# Patient Record
Sex: FEMALE | Employment: OTHER | URBAN - METROPOLITAN AREA
[De-identification: names, ages, dates, MRNs, and addresses within clinical notes are randomized per-mention and may not be internally consistent; named-entity substitution may affect disease eponyms.]

---

## 2018-01-31 ENCOUNTER — RADIANT APPOINTMENT (OUTPATIENT)
Dept: MAMMOGRAPHY | Facility: CLINIC | Age: 63
End: 2018-01-31
Payer: COMMERCIAL

## 2018-01-31 ENCOUNTER — OFFICE VISIT (OUTPATIENT)
Dept: OBGYN | Facility: CLINIC | Age: 63
End: 2018-01-31
Payer: COMMERCIAL

## 2018-01-31 VITALS
WEIGHT: 109 LBS | HEIGHT: 62 IN | SYSTOLIC BLOOD PRESSURE: 82 MMHG | DIASTOLIC BLOOD PRESSURE: 60 MMHG | BODY MASS INDEX: 20.06 KG/M2

## 2018-01-31 DIAGNOSIS — F51.01 PRIMARY INSOMNIA: ICD-10-CM

## 2018-01-31 DIAGNOSIS — Z13.29 SCREENING FOR THYROID DISORDER: ICD-10-CM

## 2018-01-31 DIAGNOSIS — M16.11 PRIMARY OSTEOARTHRITIS OF RIGHT HIP: ICD-10-CM

## 2018-01-31 DIAGNOSIS — Z13.6 SCREENING FOR CARDIOVASCULAR CONDITION: ICD-10-CM

## 2018-01-31 DIAGNOSIS — Z01.419 ENCOUNTER FOR GYNECOLOGICAL EXAMINATION WITHOUT ABNORMAL FINDING: Primary | ICD-10-CM

## 2018-01-31 DIAGNOSIS — M85.80 OSTEOPENIA, SENILE: ICD-10-CM

## 2018-01-31 DIAGNOSIS — Z13.228 SCREENING FOR METABOLIC DISORDER: ICD-10-CM

## 2018-01-31 DIAGNOSIS — Z11.8 SCREENING FOR CHLAMYDIAL DISEASE: ICD-10-CM

## 2018-01-31 DIAGNOSIS — Z12.11 SCREEN FOR COLON CANCER: ICD-10-CM

## 2018-01-31 DIAGNOSIS — Z12.31 VISIT FOR SCREENING MAMMOGRAM: ICD-10-CM

## 2018-01-31 DIAGNOSIS — Z11.3 SCREEN FOR STD (SEXUALLY TRANSMITTED DISEASE): ICD-10-CM

## 2018-01-31 PROCEDURE — 99386 PREV VISIT NEW AGE 40-64: CPT | Performed by: OBSTETRICS & GYNECOLOGY

## 2018-01-31 PROCEDURE — 77063 BREAST TOMOSYNTHESIS BI: CPT | Mod: TC

## 2018-01-31 PROCEDURE — G0145 SCR C/V CYTO,THINLAYER,RESCR: HCPCS | Performed by: OBSTETRICS & GYNECOLOGY

## 2018-01-31 PROCEDURE — 77067 SCR MAMMO BI INCL CAD: CPT | Mod: TC

## 2018-01-31 RX ORDER — TRAZODONE HYDROCHLORIDE 100 MG/1
100 TABLET ORAL
COMMUNITY
Start: 2017-10-10 | End: 2020-08-11

## 2018-01-31 RX ORDER — CALCIUM CARBONATE 500(1250)
1 TABLET ORAL 2 TIMES DAILY
COMMUNITY

## 2018-01-31 ASSESSMENT — ANXIETY QUESTIONNAIRES
7. FEELING AFRAID AS IF SOMETHING AWFUL MIGHT HAPPEN: NOT AT ALL
IF YOU CHECKED OFF ANY PROBLEMS ON THIS QUESTIONNAIRE, HOW DIFFICULT HAVE THESE PROBLEMS MADE IT FOR YOU TO DO YOUR WORK, TAKE CARE OF THINGS AT HOME, OR GET ALONG WITH OTHER PEOPLE: NOT DIFFICULT AT ALL
2. NOT BEING ABLE TO STOP OR CONTROL WORRYING: NOT AT ALL
GAD7 TOTAL SCORE: 0
5. BEING SO RESTLESS THAT IT IS HARD TO SIT STILL: NOT AT ALL
3. WORRYING TOO MUCH ABOUT DIFFERENT THINGS: NOT AT ALL
1. FEELING NERVOUS, ANXIOUS, OR ON EDGE: NOT AT ALL
6. BECOMING EASILY ANNOYED OR IRRITABLE: NOT AT ALL

## 2018-01-31 ASSESSMENT — PATIENT HEALTH QUESTIONNAIRE - PHQ9: 5. POOR APPETITE OR OVEREATING: NOT AT ALL

## 2018-01-31 NOTE — MR AVS SNAPSHOT
After Visit Summary   1/31/2018    Liliana Murray    MRN: [id number]           Patient Information     Date Of Birth          1955        Visit Information        Provider Department      1/31/2018 10:40 AM Brandy Logan MD [facility]        Today's Diagnoses     Encounter for gynecological examination without abnormal finding    -  1    Screening for cardiovascular condition        Screening for metabolic disorder        Screening for thyroid disorder        Screen for colon cancer        Osteopenia, senile        Screen for STD (sexually transmitted disease)        Screening for chlamydial disease           Follow-ups after your visit        Your next 10 appointments already scheduled     Mar 07, 2018  9:00 AM CST   DX HIP/PELVIS/SPINE with WEDEXA1   [facility] ([facility])    [address] [place] [id number] [phone]           Please do not take any of the following 24 hours prior to the day of your exam: vitamins, calcium tablets, antacids.  If possible, please wear clothes without metal (snaps, zippers). A sweatsuit works well.            Mar 07, 2018  9:40 AM CST   LAB with WE LAB   [facility] ([facility])    [address] [place] [id number] [phone]           Please do not eat 10-12 hours before your appointment if you are coming in fasting for labs on lipids, cholesterol, or glucose (sugar). This does not apply to pregnant women. Water, hot tea and black coffee (with nothing added) are okay. Do not drink other fluids, diet soda or chew gum.              Who to contact     If you have questions or need follow up information about today's clinic visit or your schedule please contact [facility] [place] directly at [phone].  Normal or non-critical lab and imaging results will be communicated to you by  "MyChart, letter or phone within 4 business days after the clinic has received the results. If you do not hear from us within 7 days, please contact the clinic through [employer] or phone. If you have a critical or abnormal lab result, we will notify you by phone as soon as possible.  Submit refill requests through Plivo or call your pharmacy and they will forward the refill request to us. Please allow 3 business days for your refill to be completed.          Additional Information About Your Visit        [employer] Information     Plivo lets you send messages to your doctor, view your test results, renew your prescriptions, schedule appointments and more. To sign up, go to [email or web address] . Click on \"Log in\" on the left side of the screen, which will take you to the Welcome page. Then click on \"Sign up Now\" on the right side of the page.     You will be asked to enter the access code listed below, as well as some personal information. Please follow the directions to create your username and password.     Your access code is: [email or web address]  Expires: 2018 12:01 PM     Your access code will  in 90 days. If you need help or a new code, please call your [facility] clinic or [phone].        Care EveryWhere ID     This is your Care EveryWhere ID. This could be used by other organizations to access your [facility] medical records  [id number]        Your Vitals Were     Height BMI (Body Mass Index)                5' 1.5\" (1.562 m) 20.26 kg/m2           Blood Pressure from Last 3 Encounters:   18 (!) 82/60    Weight from Last 3 Encounters:   18 109 lb (49.4 kg)              Today, you had the following     No orders found for display       Primary Care Provider Office Phone # Fax #    Kelle Stanley [phone]       AMY CLARK [address]  [place] [address]        Equal Access to Services     JOSHUA BRAMBILA AH: Hadii lulu Camp, zhen loo, " karin caroalwilliam onofre farazoksana gareth awadaaoksana ah. So [facility] [phone].    ATENCIÓN: Si fannie morales, tiene a farris disposición servicios gratuitos de asistencia lingüística. Baron al [phone].    We comply with applicable federal civil rights laws and [place] laws. We do not discriminate on the basis of race, color, national origin, age, disability, sex, sexual orientation, or gender identity.            Thank you!     Thank you for choosing [facility]  for your care. Our goal is always to provide you with excellent care. Hearing back from our patients is one way we can continue to improve our services. Please take a few minutes to complete the written survey that you may receive in the mail after your visit with us. Thank you!             Your Updated Medication List - Protect others around you: Learn how to safely use, store and throw away your medicines at www.disposemymeds.org.          This list is accurate as of 1/31/18 12:01 PM.  Always use your most recent med list.                   Brand Name Dispense Instructions for use Diagnosis    calcium carbonate 1250 MG tablet    OS- mg [place]. [place]     Take 1 tablet by mouth 2 times daily        traZODone 100 MG tablet    DESYREL     Take 100 mg by mouth        VITAMIN B 12 PO           VITAMIN C PO           VITAMIN D (CHOLECALCIFEROL) PO      Take by mouth daily

## 2018-01-31 NOTE — PROGRESS NOTES
Liliana is a 62 year old  female who presents for annual exam.     Besides routine health maintenance, she has no other health concerns today .    HPI:  The patient's PCP is Kelle Stanley MD.  Patient hasn't been here since summer of 2014. Had a PCP but she is now retired and so wondering about a referral closer to home in [place] in case she gets sick, etc.    Has been doing 100mg trazodone with an otc sleep aid together and that works great for insomnia. Tried both of them individually and they didn't help. Started on that after her hip replacement but had had insomnia for years prior to that    Patient had some perimenopausal bleeding in  and did the hysteroscopy for it. No bleeding since then. Some very mild menopausal sx but hardly at all. Patient is newly s.a as she is dating someone. Not having vaginal dryness or pain with I.C. Not needing a lubricant. He was  for 30 yrs prior to them dating. Declines any std testing at first b/c she's sure it fine but after discussing it patient is agreeable to do it when rtns for other labs b/c overdue for fasting labs and has fhx of DM and high cholesterol in her dad and brother.    Patient had severe osteopenia on last dexa and that was over 5 yrs ago already. Never did meds and never followed up. Has since lost an inch of height. Was having some hip pain when here last. Ended up getting right hip replaced and doing great now. Can't run anymore b/c of it but still exercising regularly 2-3x/week with weight and cardio      GYNECOLOGIC HISTORY:    No LMP recorded. Patient is postmenopausal.  Her current contraception method is: menopause.  She  has no tobacco history on file.    Patient is sexually active.  STD testing offered?  Declined  Last PHQ-9 score on record =   PHQ-9 SCORE 2018   Total Score 1     Last GAD7 score on record =   EVELIN-7 SCORE 2018   Total Score 0     Alcohol Score = 2    HEALTH MAINTENANCE:  Cholesterol: 11/16/10     Total= 179,  Triglycerides=51, HDL=66, BBX=412  Cholesterol   Date Value Ref Range Status   2010 179 0 - 200 mg/dL Final     Comment:     LDL Cholesterol is the primary guide to therapy.     The NCEP recommends further evaluation of: patients with cholesterol <200   mg/dL   if additional risk factors are present, cholesterol >240 mg/dL, triglycerides   >150 mg/dL, or HDL <40 mg/dL.   Last Mammo: 16, Result: normal, Next Mammo: today  Pap: 14 NIL HPV Neg  Colonoscopy: Never, Result: not applicable, Next Colonoscopy: Will place referral for patient   Dexa: 2012    Health maintenance updated:  yes    HISTORY:  Obstetric History       T2      L2     SAB0   TAB0   Ectopic0   Multiple0   Live Births2       # Outcome Date GA Lbr Kofi/2nd Weight Sex Delivery Anes PTL Lv   2 Term     M CS-LTranv   DARIEL   1 Term     F CS-LTranv   DARIEL          Patient Active Problem List   Diagnosis     Status post right hip replacement     Osteopenia, senile     Primary insomnia     Past Surgical History:   Procedure Laterality Date     AS PARTIAL HIP REPLACEMENT  2017     HYSTEROSCOPY  2010      Social History   Substance Use Topics     Smoking status: Not on file     Smokeless tobacco: Not on file     Alcohol use Not on file      Problem (# of Occurrences) Relation (Name,Age of Onset)    CANCER (1) Father    DIABETES (2) Father, Brother    HEART DISEASE (1) Maternal Grandmother    Hyperlipidemia (2) Father, Brother    Hypertension (2) Father, Brother            Current Outpatient Prescriptions   Medication Sig     Ascorbic Acid (VITAMIN C PO)      Cyanocobalamin (VITAMIN B 12 PO)      calcium carbonate (OS- [place]. [place]) 1250 MG tablet Take 1 tablet by mouth 2 times daily     VITAMIN D, CHOLECALCIFEROL, PO Take by mouth daily     traZODone (DESYREL) 100 MG tablet Take 100 mg by mouth     No current facility-administered medications for this visit.      Allergies   Allergen Reactions     Diatrizoate  "Swelling, Hives and Shortness Of Breath     Hydrocodone-Acetaminophen Itching       Past medical, surgical, social and family histories were reviewed and updated in EPIC.    ROS:   12 point review of systems negative other than symptoms noted below.  Head: Nasal Congestion  Psychiatric: Difficulty Sleeping    EXAM:  BP (!) 82/60  Ht 5' 1.5\" (1.562 m)  Wt 109 lb (49.4 kg)  BMI 20.26 kg/m2   BMI: Body mass index is 20.26 kg/(m^2).    PHYSICAL EXAM:  Constitutional:  Appearance: Well nourished, well developed, alert, in no acute distress  Neck:  Lymph Nodes:  No lymphadenopathy present    Thyroid:  Gland size normal, nontender, no nodules or masses present  on palpation  Chest:  Respiratory Effort:  Breathing unlabored  Cardiovascular:    Heart: Auscultation:  Regular rate, normal rhythm, no murmurs present  Breasts: Palpation of Breasts and Axillae:  No masses present on palpation, no breast tenderness. and No nodularity, asymmetry or nipple discharge bilaterally.  Gastrointestinal:   Abdominal Examination:  Abdomen nontender to palpation, tone normal without rigidity or guarding, no masses present, umbilicus without lesions   Liver and Spleen:  No hepatomegaly present, liver nontender to palpation    Hernias:  No hernias present  Lymphatic: Lymph Nodes:  No other lymphadenopathy present  Skin:  General Inspection:  No rashes present, no lesions present, no areas of  discoloration    Genitalia and Groin:  No rashes present, no lesions present, no areas of  discoloration, no masses present  Neurologic/Psychiatric:    Mental Status:  Oriented X3     Pelvic Exam:  External Genitalia:     Normal appearance for age, no discharge present, no tenderness present, no inflammatory lesions present, color normal  Vagina:     Normal vaginal vault without central or paravaginal defects, ATROPHIC  Bladder:     Nontender to palpation  Urethra:   Urethral Body:  Urethra palpation normal, urethra structural support normal   Urethral " Meatus:  No erythema or lesions present  Cervix:     Appearance healthy, no lesions present, nontender to palpation, no bleeding present  Uterus:     Nontender to palpation, no masses present, position anteflexed, mobility: normal  Adnexa:     No adnexal tenderness present, no adnexal masses present  Perineum:     Perineum within normal limits, no evidence of trauma, no rashes or skin lesions present  Inguinal Lymph Nodes:     No lymphadenopathy present      COUNSELING:   Reviewed preventive health counseling, as reflected in patient instructions  Special attention given to:        Immunizations    pneumovax           Osteoporosis Prevention/Bone Health    BMI: Body mass index is 20.26 kg/(m^2).      ASSESSMENT:  62 year old female with satisfactory annual exam.    ICD-10-CM    1. Encounter for gynecological examination without abnormal finding Z01.419 Pap imaged thin layer screen with HPV - recommended age 30 - 65     HPV High Risk Types DNA Cervical   2. Osteopenia, senile M85.80 DX Hip/Pelvis/Spine   3. Primary insomnia F51.01    4. Primary osteoarthritis of right hip M16.11    5. Screening for cardiovascular condition Z13.6 Lipid panel reflex to direct LDL Fasting   6. Screening for metabolic disorder Z13.228 Comprehensive metabolic panel   7. Screening for thyroid disorder Z13.29 TSH with Free T4 Reflex   8. Screen for colon cancer Z12.11 GASTROENTEROLOGY ADULT REF PROCEDURE ONLY   9. Screen for STD (sexually transmitted disease) Z11.3 Anti Treponema     Herpes Simplex Virus 1 and 2 IgG     HIV Antigen Antibody Combo     Hepatitis C Antibody     NEISSERIA GONORRHOEA PCR   10. Screening for chlamydial disease Z11.8 CHLAMYDIA TRACHOMATIS PCR       PLAN:  Pap was done today though technically is UTD. Patient had normal and neg for HPV pap 3 yrs ago but the year prior was HPV pos and wasn't even s.a at that time. Now patient has a new partner since she was in and it's been almost 4 yrs since pap so chose to repeat  it  mammo today  Will return for fasting labs, dexa and full std screening  Referral sent for colonoscopy and patient is willing to do it now that retired and has more time  Discussed her low weight being a big risk for osteoporosis and patient has lost an inch of height since last here as well so need to repeat dexa and figure out next steps. Discussed weight bearing exercise, calcium, vitamin D    Brandy Logan MD

## 2018-02-01 PROBLEM — F51.01 PRIMARY INSOMNIA: Status: ACTIVE | Noted: 2018-02-01

## 2018-02-01 PROBLEM — Z96.641 STATUS POST RIGHT HIP REPLACEMENT: Status: ACTIVE | Noted: 2018-02-01

## 2018-02-01 PROBLEM — M85.80 OSTEOPENIA, SENILE: Status: ACTIVE | Noted: 2018-02-01

## 2018-02-01 PROCEDURE — 87624 HPV HI-RISK TYP POOLED RSLT: CPT | Performed by: OBSTETRICS & GYNECOLOGY

## 2018-02-01 ASSESSMENT — PATIENT HEALTH QUESTIONNAIRE - PHQ9: SUM OF ALL RESPONSES TO PHQ QUESTIONS 1-9: 1

## 2018-02-01 ASSESSMENT — ANXIETY QUESTIONNAIRES: GAD7 TOTAL SCORE: 0

## 2018-02-03 ENCOUNTER — HEALTH MAINTENANCE LETTER (OUTPATIENT)
Age: 63
End: 2018-02-03

## 2018-02-05 LAB
COPATH REPORT: NORMAL
PAP: NORMAL

## 2018-02-07 LAB
FINAL DIAGNOSIS: NORMAL
HPV HR 12 DNA CVX QL NAA+PROBE: NEGATIVE
HPV16 DNA SPEC QL NAA+PROBE: NEGATIVE
HPV18 DNA SPEC QL NAA+PROBE: NEGATIVE
SPECIMEN DESCRIPTION: NORMAL
SPECIMEN SOURCE CVX/VAG CYTO: NORMAL

## 2018-03-07 ENCOUNTER — RADIANT APPOINTMENT (OUTPATIENT)
Dept: BONE DENSITY | Facility: CLINIC | Age: 63
End: 2018-03-07
Payer: COMMERCIAL

## 2018-03-07 DIAGNOSIS — Z13.6 SCREENING FOR CARDIOVASCULAR CONDITION: ICD-10-CM

## 2018-03-07 DIAGNOSIS — M81.0 OSTEOPOROSIS, SENILE: Primary | ICD-10-CM

## 2018-03-07 DIAGNOSIS — Z13.29 SCREENING FOR THYROID DISORDER: ICD-10-CM

## 2018-03-07 DIAGNOSIS — Z13.21 ENCOUNTER FOR VITAMIN DEFICIENCY SCREENING: Primary | ICD-10-CM

## 2018-03-07 DIAGNOSIS — Z13.228 SCREENING FOR METABOLIC DISORDER: ICD-10-CM

## 2018-03-07 DIAGNOSIS — M85.80 OSTEOPENIA, SENILE: ICD-10-CM

## 2018-03-07 DIAGNOSIS — Z11.3 SCREEN FOR STD (SEXUALLY TRANSMITTED DISEASE): ICD-10-CM

## 2018-03-07 DIAGNOSIS — Z78.0 ASYMPTOMATIC POSTMENOPAUSAL STATE: ICD-10-CM

## 2018-03-07 DIAGNOSIS — Z11.8 SCREENING FOR CHLAMYDIAL DISEASE: ICD-10-CM

## 2018-03-07 LAB
ALBUMIN SERPL-MCNC: 3.9 G/DL (ref 3.4–5)
ALP SERPL-CCNC: 83 U/L (ref 40–150)
ALT SERPL W P-5'-P-CCNC: 17 U/L (ref 0–50)
ANION GAP SERPL CALCULATED.3IONS-SCNC: 3 MMOL/L (ref 3–14)
AST SERPL W P-5'-P-CCNC: 20 U/L (ref 0–45)
BILIRUB SERPL-MCNC: 0.4 MG/DL (ref 0.2–1.3)
BUN SERPL-MCNC: 13 MG/DL (ref 7–30)
CALCIUM SERPL-MCNC: 8.8 MG/DL (ref 8.5–10.1)
CHLORIDE SERPL-SCNC: 102 MMOL/L (ref 94–109)
CHOLEST SERPL-MCNC: 162 MG/DL
CO2 SERPL-SCNC: 30 MMOL/L (ref 20–32)
CREAT SERPL-MCNC: 0.65 MG/DL (ref 0.52–1.04)
GFR SERPL CREATININE-BSD FRML MDRD: >90 ML/MIN/1.7M2
GLUCOSE SERPL-MCNC: 81 MG/DL (ref 70–99)
HDLC SERPL-MCNC: 81 MG/DL
LDLC SERPL CALC-MCNC: 73 MG/DL
MAGNESIUM SERPL-MCNC: 2.2 MG/DL (ref 1.6–2.3)
NONHDLC SERPL-MCNC: 81 MG/DL
PHOSPHATE SERPL-MCNC: 4 MG/DL (ref 2.5–4.5)
POTASSIUM SERPL-SCNC: 3.8 MMOL/L (ref 3.4–5.3)
PROT SERPL-MCNC: 8 G/DL (ref 6.8–8.8)
SODIUM SERPL-SCNC: 135 MMOL/L (ref 133–144)
TRIGL SERPL-MCNC: 40 MG/DL
TSH SERPL DL<=0.005 MIU/L-ACNC: 2.66 MU/L (ref 0.4–4)

## 2018-03-07 PROCEDURE — 77080 DXA BONE DENSITY AXIAL: CPT | Mod: 59

## 2018-03-07 PROCEDURE — 86696 HERPES SIMPLEX TYPE 2 TEST: CPT | Performed by: OBSTETRICS & GYNECOLOGY

## 2018-03-07 PROCEDURE — 80061 LIPID PANEL: CPT | Performed by: OBSTETRICS & GYNECOLOGY

## 2018-03-07 PROCEDURE — 86695 HERPES SIMPLEX TYPE 1 TEST: CPT | Performed by: OBSTETRICS & GYNECOLOGY

## 2018-03-07 PROCEDURE — 82306 VITAMIN D 25 HYDROXY: CPT | Performed by: OBSTETRICS & GYNECOLOGY

## 2018-03-07 PROCEDURE — 87491 CHLMYD TRACH DNA AMP PROBE: CPT | Performed by: OBSTETRICS & GYNECOLOGY

## 2018-03-07 PROCEDURE — 87591 N.GONORRHOEAE DNA AMP PROB: CPT | Performed by: OBSTETRICS & GYNECOLOGY

## 2018-03-07 PROCEDURE — 83735 ASSAY OF MAGNESIUM: CPT | Performed by: OBSTETRICS & GYNECOLOGY

## 2018-03-07 PROCEDURE — 86780 TREPONEMA PALLIDUM: CPT | Performed by: OBSTETRICS & GYNECOLOGY

## 2018-03-07 PROCEDURE — 87389 HIV-1 AG W/HIV-1&-2 AB AG IA: CPT | Performed by: OBSTETRICS & GYNECOLOGY

## 2018-03-07 PROCEDURE — 80053 COMPREHEN METABOLIC PANEL: CPT | Performed by: OBSTETRICS & GYNECOLOGY

## 2018-03-07 PROCEDURE — 77081 DXA BONE DENSITY APPENDICULR: CPT

## 2018-03-07 PROCEDURE — 84443 ASSAY THYROID STIM HORMONE: CPT | Performed by: OBSTETRICS & GYNECOLOGY

## 2018-03-07 PROCEDURE — 36415 COLL VENOUS BLD VENIPUNCTURE: CPT | Performed by: OBSTETRICS & GYNECOLOGY

## 2018-03-07 PROCEDURE — 84100 ASSAY OF PHOSPHORUS: CPT | Performed by: OBSTETRICS & GYNECOLOGY

## 2018-03-07 PROCEDURE — 86803 HEPATITIS C AB TEST: CPT | Performed by: OBSTETRICS & GYNECOLOGY

## 2018-03-08 LAB
C TRACH DNA SPEC QL NAA+PROBE: NEGATIVE
DEPRECATED CALCIDIOL+CALCIFEROL SERPL-MC: 71 UG/L (ref 20–75)
HCV AB SERPL QL IA: NONREACTIVE
HIV 1+2 AB+HIV1 P24 AG SERPL QL IA: NONREACTIVE
HSV1 IGG SERPL QL IA: >8 AI (ref 0–0.8)
HSV2 IGG SERPL QL IA: <0.2 AI (ref 0–0.8)
N GONORRHOEA DNA SPEC QL NAA+PROBE: NEGATIVE
SPECIMEN SOURCE: NORMAL
SPECIMEN SOURCE: NORMAL
T PALLIDUM IGG+IGM SER QL: NEGATIVE

## 2018-03-21 ENCOUNTER — OFFICE VISIT (OUTPATIENT)
Dept: OBGYN | Facility: CLINIC | Age: 63
End: 2018-03-21
Payer: COMMERCIAL

## 2018-03-21 VITALS
HEIGHT: 62 IN | DIASTOLIC BLOOD PRESSURE: 64 MMHG | BODY MASS INDEX: 20.43 KG/M2 | WEIGHT: 111 LBS | SYSTOLIC BLOOD PRESSURE: 112 MMHG

## 2018-03-21 DIAGNOSIS — M81.0 OSTEOPOROSIS, SENILE: Primary | ICD-10-CM

## 2018-03-21 PROBLEM — M85.80 OSTEOPENIA, SENILE: Status: RESOLVED | Noted: 2018-02-01 | Resolved: 2018-03-21

## 2018-03-21 PROCEDURE — 99214 OFFICE O/P EST MOD 30 MIN: CPT | Performed by: OBSTETRICS & GYNECOLOGY

## 2018-03-21 NOTE — Clinical Note
Ordered prolia for this person. I assume we wait for insurance to contact us on coverage? Just wanted it on your radar. thx

## 2018-03-21 NOTE — MR AVS SNAPSHOT
"              After Visit Summary   3/21/2018    Liliana Murray    MRN: [id number]           Patient Information     Date Of Birth          1955        Visit Information        Provider Department      3/21/2018 3:40 PM Brandy Logan MD [facility] [place]        Today's Diagnoses     Osteoporosis, senile    -  1       Follow-ups after your visit        Who to contact     If you have questions or need follow up information about today's clinic visit or your schedule please contact [facility] [place] directly at [phone].  Normal or non-critical lab and imaging results will be communicated to you by [employer], letter or phone within 4 business days after the clinic has received the results. If you do not hear from us within 7 days, please contact the clinic through [employer] or phone. If you have a critical or abnormal lab result, we will notify you by phone as soon as possible.  Submit refill requests through [employer]we[employer] or call your pharmacy and they will forward the refill request to us. Please allow 3 business days for your refill to be completed.          Additional Information About Your Visit        MyChart Information     [employer] gives you secure access to your electronic health record. If you see a primary care provider, you can also send messages to your care team and make appointments. If you have questions, please call your primary care clinic.  If you do not have a primary care provider, please call [phone] and they will assist you.        Care EveryWhere ID     This is your Care EveryWhere ID. This could be used by other organizations to access your [place] medical records  [id number]        Your Vitals Were     Height BMI (Body Mass Index)                5' 1.5\" (1.562 m) 20.63 kg/m2           Blood Pressure from Last 3 Encounters:   03/21/18 112/64   01/31/18 (!) 82/60    Weight from Last 3 Encounters:   03/21/18 111 lb (50.3 kg)   01/31/18 109 lb (49.4 kg)    "           Today, you had the following     No orders found for display         Today's Medication Changes          These changes are accurate as of 3/21/18 11:59 PM.  If you have any questions, ask your nurse or doctor.               Start taking these medicines.        Dose/Directions    denosumab 60 MG/ML Soln injection   Commonly known as:  PROLIA   Used for:  Osteoporosis, senile   Started by:  Brandy Logan MD        Dose:  60 mg   Inject 1 mL (60 mg) Subcutaneous once for 1 dose   Quantity:  1 mL   Refills:  1            Where to get your medicines      These medications were sent to [id number] - [place] [address] AT [facility] [place] [address], [facility] [address]     Phone:  [phone]     denosumab 60 MG/ML Soln injection                Primary Care Provider Office Phone # Fax #    Kelle Stanley [phone]       AMY CLARK [address]  [place] [address]        Equal Access to Services     [facility]: Hadii aad ku hadasho Soomaali, waaxda luqadaha, qaybta kaalmada adeegyada, waxay idiin hayaan gareth hernandez . So [facility] [phone].    ATENCIÓN: Si habla español, tiene a farris disposición servicios gratuitos de asistencia lingüística. [facility] [phone].    We comply with applicable federal civil rights laws and [place] laws. We do not discriminate on the basis of race, color, national origin, age, disability, sex, sexual orientation, or gender identity.            Thank you!     Thank you for choosing [facility] [place]  for your care. Our goal is always to provide you with excellent care. Hearing back from our patients is one way we can continue to improve our services. Please take a few minutes to complete the written survey that you may receive in the mail after your visit with us. Thank you!             Your Updated Medication List - Protect others around you: Learn how to safely  use, store and throw away your medicines at www.disposemymeds.org.          This list is accurate as of 3/21/18 11:59 PM.  Always use your most recent med list.                   Brand Name Dispense Instructions for use Diagnosis    calcium carbonate 1250 MG tablet    OS- mg [place]. Ca     Take 1 tablet by mouth 2 times daily        denosumab 60 MG/ML Soln injection    PROLIA    1 mL    Inject 1 mL (60 mg) Subcutaneous once for 1 dose    Osteoporosis, senile       traZODone 100 MG tablet    DESYREL     Take 100 mg by mouth        TURMERIC PO           VITAMIN B 12 PO           VITAMIN C PO           VITAMIN D (CHOLECALCIFEROL) PO      Take by mouth daily

## 2018-03-21 NOTE — PROGRESS NOTES
SUBJECTIVE:                                                   Liliana Murray is a 63 year old female who presents to clinic today for the following health issue(s):  Patient presents with:  Results: discuss DEXA        HPI:  Patient had a dexa on 3/8/18 that showed severe osteoporosis in her wrist. This was done b/c of unilateral hip replacement. The T score was -7.4  Her non replaced hip was -2.5 and her total spine was -2.4. However her L2-L3 was -2.9 with one vertebral body at -3.2 and another at -2.6  Patient's last dexa showed penia so this is significant progression  Patient is doing a lot of weight bearing exercise. Walking, jogging, jumping rope, eliptical and arm weights.  She is taking her calcium and vitamin D religiously since finding out but wasn't as regimented before that. She did have levels drawn and her ca, D, mg, phos were all normal  Patient's sister on fosamax for osteoporosis and it really worries her to get a dowager's hump  Here to discuss options    No LMP recorded. Patient is postmenopausal..   Patient is sexually active, .   reports that she has never smoked. She has never used smokeless tobacco.  Health maintenance updated:  yes        Problem list and histories reviewed & adjusted, as indicated.  Additional history: as documented.    Patient Active Problem List   Diagnosis     Status post right hip replacement     Primary insomnia     Osteoporosis, senile     Past Surgical History:   Procedure Laterality Date     AS PARTIAL HIP REPLACEMENT  2017     HYSTEROSCOPY  2010      Social History   Substance Use Topics     Smoking status: Never Smoker     Smokeless tobacco: Never Used     Alcohol use Not on file      Problem (# of Occurrences) Relation (Name,Age of Onset)    CANCER (1) Father    DIABETES (2) Father, Brother    HEART DISEASE (1) Maternal Grandmother    Hyperlipidemia (2) Father, Brother    Hypertension (2) Father, Brother            Current Outpatient  "Prescriptions   Medication Sig     TURMERIC PO      denosumab (PROLIA) 60 MG/ML SOLN injection Inject 1 mL (60 mg) Subcutaneous once for 1 dose     Ascorbic Acid (VITAMIN C PO)      Cyanocobalamin (VITAMIN B 12 PO)      calcium carbonate (OS- [place]. CA) 1250 MG tablet Take 1 tablet by mouth 2 times daily     VITAMIN D, CHOLECALCIFEROL, PO Take by mouth daily     traZODone (DESYREL) 100 MG tablet Take 100 mg by mouth     No current facility-administered medications for this visit.      Allergies   Allergen Reactions     Diatrizoate Swelling, Hives and Shortness Of Breath     Hydrocodone-Acetaminophen Itching       ROS:  12 point review of systems negative other than symptoms noted below.    OBJECTIVE:     /64  Ht 5' 1.5\" (1.562 m)  Wt 111 lb (50.3 kg)  BMI 20.63 kg/m2  Body mass index is 20.63 kg/(m^2).    Exam:  Constitutional:  Appearance: Well nourished, well developed alert, in no acute distress     In-Clinic Test Results:  No results found for this or any previous visit (from the past 24 hour(s)).    ASSESSMENT/PLAN:                                                        ICD-10-CM    1. Osteoporosis, senile M81.0 denosumab (PROLIA) 60 MG/ML SOLN injection       Discussed her dexa scores and reviewed images together and discussed vitamins, weight bearing, bisphosphanate therapy with all of it's r/b/side effects, discussed estrogen and SERMs.  Patient is actually taking more D than needed. Taking 5000 units daily in addition to some D in her calcium. Ok to switch to just 8111-6640 max u/day  Discussed weights and weight bearing exercise  Patient has no vasomotor sx that ERT would be indicated for so at this point I feel that our best course of action is bisphosphanate therapy  Patient already has some GERD and may not tolerate fosamax. In addition to this I have informed her that the rate of efficacy and improvement is much greater with prolia than with fosamax and with her ulna especially being " so osteoporotic I would prefer we go with prolia.  Will find out insurance coverage and proceed if covered/affordable.  Spent 25 minutes with her 100% of which was in face to face counseling time and answered many questions that the patient had      Brandy Logan MD  [facility]

## 2018-03-22 ENCOUNTER — TELEPHONE (OUTPATIENT)
Dept: OBGYN | Facility: CLINIC | Age: 63
End: 2018-03-22

## 2018-03-22 DIAGNOSIS — M81.0 OSTEOPOROSIS, SENILE: Primary | ICD-10-CM

## 2018-03-22 NOTE — TELEPHONE ENCOUNTER
New RX for Prolia entered and routed to [facility] to start process. Patient aware of we will contact her once Prolia has received request and they have reviewed coverage with her insurance plan and we hear back from them.

## 2018-04-04 NOTE — TELEPHONE ENCOUNTER
Benefits relayed to patient,  explained to patient that her insurance requires a PA, explained that at this point can still be denied.  Patient concerned about cost. I explained the Prolia copay program. Patient feels at this point she has further questions that I was not able to answer for her. She would like to talk with Dr. Logan. I offered her appointment and patient states she was already here for this and if able would like to handle by phone. I had patient schedule appointment to have a spot, and she can cancel if Dr. Logan feels that this could be done by phone.     Buy/bill ok. Needs a PA. She needs to reach her $1000 ded (met), responsible for 20% up till OPM of $2000 ($1130.41 met so far), the coverage increases to 100%.    PA faxed, await response.

## 2018-04-07 NOTE — TELEPHONE ENCOUNTER
i'm routing this to triage to please figure out what her questions are to see if they can be answered by phone

## 2018-04-11 ENCOUNTER — TELEPHONE (OUTPATIENT)
Dept: NURSING | Facility: CLINIC | Age: 63
End: 2018-04-11

## 2018-04-11 RX ORDER — ALENDRONATE SODIUM 70 MG/1
TABLET ORAL
Qty: 12 TABLET | Refills: 3 | Status: SHIPPED | OUTPATIENT
Start: 2018-04-11 | End: 2019-04-18

## 2018-04-11 NOTE — TELEPHONE ENCOUNTER
I think a lot of these questions can be triaged by RN    We use prolia for one year, then do dexa, if good results but still not good score would continue 2 more years and repeat dexa. At some point yes, we try to get to a good score and then take a break for awhile but that's how we monitor bone loss, with dexa.    No way to monitor in the jaw. What her dentist said is unrelated to the jaw necrosis with bisphophanate and that is incredibly low risk anyway and I wouldn't worry about it.    No, I don't seem the same level of improvement prolia vs fosamax but some people do great on fosamax. Since we haven't even tried that and prolia is going to go fully toward deductible there's no reason we couldn't start with fosamax and see specifically how she did on it. If not good enough results then would have more reason to justify a change to the more expensive    No effect on BP    Yes would be fine to start after her root canal regardless of which med she chooses

## 2018-04-11 NOTE — TELEPHONE ENCOUNTER
Pt calling in with questions for Dr. Logan regarding prolia.    * Do you have to be on prolia long term? If using prolia for a period of time and get good results can she change to another med to maintain that level?  ( hoping she can use of med that is less expensive)    * Would fosamax use give her the same results vs using prolia?    Prolia- jaw bone concerns. Her dentist has indicated that she does have bone loss in her jaw now- Is this going to be more of a problem? How do you monitor for further bone loss? Is there labwork that monitors? How frequent if so?     * She has low BP now- What could happen w/ being on prolia?    *Pt said that she needs root canal in the next couple of weeks- okay to start after?    * Pt questions what is the best med to be on and the most cost effective? ( pt says prolia will be costing her about $3000 a year.)    Routing to Dr Logan to advise

## 2018-04-26 NOTE — TELEPHONE ENCOUNTER
Can you guys call patient, per Dr. Logan note below she thought a lot of patient questions/concerns could be triaged by RN.

## 2018-04-26 NOTE — TELEPHONE ENCOUNTER
Reason for Call:  Other call back    Detailed comments: patient cancelled her appt with AJ on 4/27 to discuss Prolia. She said she is starting the Fosomax next week, but still wants to speak to AJ about the Prolia.    Phone Number Patient can be reached at: Cell number on file:    Telephone Information:   Mobile [phone]       Best Time:  tomorrow    Can we leave a detailed message on this number? YES    Call taken on 4/26/2018 at 11:11 AM by Naty De Los Santos

## 2019-03-07 ENCOUNTER — ANCILLARY PROCEDURE (OUTPATIENT)
Dept: MAMMOGRAPHY | Facility: CLINIC | Age: 64
End: 2019-03-07
Attending: OBSTETRICS & GYNECOLOGY
Payer: COMMERCIAL

## 2019-03-07 DIAGNOSIS — Z12.31 VISIT FOR SCREENING MAMMOGRAM: ICD-10-CM

## 2019-03-07 PROCEDURE — 77067 SCR MAMMO BI INCL CAD: CPT | Mod: TC

## 2019-03-07 PROCEDURE — 77063 BREAST TOMOSYNTHESIS BI: CPT | Mod: TC

## 2019-04-15 ENCOUNTER — TELEPHONE (OUTPATIENT)
Dept: OBGYN | Facility: CLINIC | Age: 64
End: 2019-04-15

## 2019-04-15 DIAGNOSIS — M81.0 OSTEOPOROSIS, SENILE: Primary | ICD-10-CM

## 2019-04-15 NOTE — TELEPHONE ENCOUNTER
PA for Prolia was denied. Pt was started on alendronate (FOSAMAX) 70 MG tablet on 4/11/18. Has been taking every 7 days. Would like to see if helping. Routing to Dr. Logan.

## 2019-04-15 NOTE — TELEPHONE ENCOUNTER
LMTCB to discuss information below.         Patient called and scheduled repeat dexa. Trying to follow the phone notes in her chart I can't tell why we didn't do prolia, if she did do fosamax, etc.     If she didn't do any meds or make any changes repeating the dexa is done every 2 yrs. If we made a change, like starting fosamax, then yes she can have a dexa wed before she sees me.     Can we clarify so I can decide if appropriate to order or not?   AJ

## 2019-04-17 ENCOUNTER — ANCILLARY PROCEDURE (OUTPATIENT)
Dept: BONE DENSITY | Facility: CLINIC | Age: 64
End: 2019-04-17
Payer: COMMERCIAL

## 2019-04-17 ENCOUNTER — OFFICE VISIT (OUTPATIENT)
Dept: OBGYN | Facility: CLINIC | Age: 64
End: 2019-04-17
Payer: COMMERCIAL

## 2019-04-17 VITALS
SYSTOLIC BLOOD PRESSURE: 112 MMHG | BODY MASS INDEX: 21.16 KG/M2 | HEIGHT: 62 IN | DIASTOLIC BLOOD PRESSURE: 68 MMHG | WEIGHT: 115 LBS

## 2019-04-17 DIAGNOSIS — F52.31 ANORGASMIA OF FEMALE: ICD-10-CM

## 2019-04-17 DIAGNOSIS — M81.0 OSTEOPOROSIS, SENILE: ICD-10-CM

## 2019-04-17 DIAGNOSIS — Z01.419 ENCOUNTER FOR GYNECOLOGICAL EXAMINATION WITHOUT ABNORMAL FINDING: Primary | ICD-10-CM

## 2019-04-17 DIAGNOSIS — Z23 NEED FOR PNEUMOCOCCAL VACCINE: ICD-10-CM

## 2019-04-17 DIAGNOSIS — Z23 NEED FOR TDAP VACCINATION: ICD-10-CM

## 2019-04-17 DIAGNOSIS — Z12.11 SCREEN FOR COLON CANCER: ICD-10-CM

## 2019-04-17 DIAGNOSIS — F51.01 PRIMARY INSOMNIA: ICD-10-CM

## 2019-04-17 PROCEDURE — 99396 PREV VISIT EST AGE 40-64: CPT | Mod: 25 | Performed by: OBSTETRICS & GYNECOLOGY

## 2019-04-17 PROCEDURE — 77080 DXA BONE DENSITY AXIAL: CPT | Performed by: OBSTETRICS & GYNECOLOGY

## 2019-04-17 PROCEDURE — 90472 IMMUNIZATION ADMIN EACH ADD: CPT | Performed by: OBSTETRICS & GYNECOLOGY

## 2019-04-17 PROCEDURE — 90471 IMMUNIZATION ADMIN: CPT | Performed by: OBSTETRICS & GYNECOLOGY

## 2019-04-17 PROCEDURE — 90670 PCV13 VACCINE IM: CPT | Performed by: OBSTETRICS & GYNECOLOGY

## 2019-04-17 PROCEDURE — 99213 OFFICE O/P EST LOW 20 MIN: CPT | Mod: 25 | Performed by: OBSTETRICS & GYNECOLOGY

## 2019-04-17 PROCEDURE — 90715 TDAP VACCINE 7 YRS/> IM: CPT | Performed by: OBSTETRICS & GYNECOLOGY

## 2019-04-17 PROCEDURE — 96372 THER/PROPH/DIAG INJ SC/IM: CPT | Performed by: OBSTETRICS & GYNECOLOGY

## 2019-04-17 RX ORDER — TESTOSTERONE CYPIONATE 200 MG/ML
150 INJECTION, SOLUTION INTRAMUSCULAR ONCE
Status: COMPLETED | OUTPATIENT
Start: 2019-04-17 | End: 2019-04-17

## 2019-04-17 RX ADMIN — TESTOSTERONE CYPIONATE 150 MG: 200 INJECTION, SOLUTION INTRAMUSCULAR at 14:26

## 2019-04-17 ASSESSMENT — MIFFLIN-ST. JEOR: SCORE: 1034.89

## 2019-04-17 NOTE — PROGRESS NOTES
"  Liliana is a 64 year old  female who presents for annual exam.     Besides routine health maintenance, she has no other health concerns today .    HPI:  The patient's PCP is Kelle Stanley MD.    Patient has had insomnia for years. Has a PCP and he is rx'ing her trazodone. Does 100mg and an otc sleep aid and they help together.    Saw me last year after a longer break before that and had a dexa. Severe penia about 6 yrs ago. Last year had osteoporosis in her spine and the hip that we could scan that hadn't been replaced. She actually had a forearm dexa as well that was severely low T scores but then this year is actually being reevaluated and found that it wasn't done quite correctly. Given that she has one hip and normal spine to scan we will not do a forearm dexa going forward.  Patient was going to try to get prolia but not covered by insurance unless she failed fosamax first. So now has been on fosamax weekly for a year. Tolerating it just fine and no side effects at all. Having a dexa after this appointment    Already had her mammo    Fasting labs last year were normal. Has still never had a colonoscopy even though she'd agreed to getting one last year and referral was sent. States it was purely laziness and logistics and really does plan to do it this year. Has no GI sx of concer at this time.    Patient has been with her BF for about 5 yrs now and so they are regular s.a. When here last time reported that she was not having any dryness or pain but would use a lubricant and that was adequate. Today she states that that is still mostly the case but she feels like things \"just don't feel the same down there and like they used to\".  Still has libido and interest and is getting aroused, but the sensation is just not the same, not as intense, not as pleasurable. \"is there something for women like men get to have with viagra?\"  GYNECOLOGIC HISTORY:    No LMP recorded. Patient is postmenopausal.  Her current " contraception method is: menopause.  She  reports that she has never smoked. She has never used smokeless tobacco.    Patient is sexually active.  STD testing offered?  Declined  Last PHQ-9 score on record =   PHQ-9 SCORE 2018   PHQ-9 Total Score 1     Last GAD7 score on record =   EVELIN-7 SCORE 2018   Total Score 0     Alcohol Score = 2    HEALTH MAINTENANCE:  Cholesterol:   Cholesterol   Date Value Ref Range Status   2018 162 <200 mg/dL Final   2010 179 0 - 200 mg/dL Final     Comment:     LDL Cholesterol is the primary guide to therapy.     The NCEP recommends further evaluation of: patients with cholesterol <200   mg/dL   if additional risk factors are present, cholesterol >240 mg/dL, triglycerides   >150 mg/dL, or HDL <40 mg/dL.    3/7/18   Total= 162, Triglycerides=40, HDL=81, LDL=73, FBS=81, TSH=2.66  Last Mammo: 3/7/19, Result: normal, Next Mammo: next year  Pap:   Lab Results   Component Value Date    PAP NIL 2018 WNL HPV (-)neg  Colonoscopy:  Never, Result: not applicable, Next Colonoscopy: wants referral .  Dexa:  3/7/18    Health maintenance updated:  yes    HISTORY:  OB History    Para Term  AB Living   2 2 2 0 0 2   SAB TAB Ectopic Multiple Live Births   0 0 0 0 2      # Outcome Date GA Lbr Kofi/2nd Weight Sex Delivery Anes PTL Lv   2 Term     M CS-LTranv   DARIEL   1 Term     F CS-LTranv   DARIEL       Patient Active Problem List   Diagnosis     Status post right hip replacement     Primary insomnia     Osteoporosis, senile     Past Surgical History:   Procedure Laterality Date     AS PARTIAL HIP REPLACEMENT  2017     HYSTEROSCOPY  2010    polypectomy with morsellator done by [facility]. path c/w polyp but also simple hyperplasia w/o atypia...3 months of provera planned.      Social History     Tobacco Use     Smoking status: Never Smoker     Smokeless tobacco: Never Used   Substance Use Topics     Alcohol use: Not on file      Problem (# of  "Occurrences) Relation (Name,Age of Onset)    Cancer (1) Father    Diabetes (2) Father, Brother    Heart Disease (1) Maternal Grandmother    Hyperlipidemia (2) Father, Brother    Hypertension (2) Father, Brother            Current Outpatient Medications   Medication Sig     alendronate (FOSAMAX) 70 MG tablet Take 1 tablet (70 mg) by mouth with 8oz water every 7 days 30 minutes before breakfast and remain upright during this time.     Ascorbic Acid (VITAMIN C PO)      calcium carbonate (OS- MG [place]. CA) 1250 MG tablet Take 1 tablet by mouth 2 times daily     traZODone (DESYREL) 100 MG tablet Take 100 mg by mouth     TURMERIC PO      VITAMIN D, CHOLECALCIFEROL, PO Take by mouth daily     Cyanocobalamin (VITAMIN B 12 PO)      No current facility-administered medications for this visit.      Allergies   Allergen Reactions     Diatrizoate Swelling, Hives and Shortness Of Breath     Hydrocodone-Acetaminophen Itching       Past medical, surgical, social and family histories were reviewed and updated in EPIC.    ROS:   12 point review of systems negative other than symptoms noted below.  Endocrine: Decreased Libido  Psychiatric: Difficulty Sleeping    EXAM:  /68   Ht 1.575 m (5' 2\")   Wt 52.2 kg (115 lb)   Breastfeeding? No   BMI 21.03 kg/m     BMI: Body mass index is 21.03 kg/m .    PHYSICAL EXAM:  Constitutional:  Appearance: Well nourished, well developed, alert, in no acute distress  Neck:  Lymph Nodes:  No lymphadenopathy present    Thyroid:  Gland size normal, nontender, no nodules or masses present  on palpation  Chest:  Respiratory Effort:  Breathing unlabored  Cardiovascular:    Heart: Auscultation:  Regular rate, normal rhythm, no murmurs present  Breasts: Palpation of Breasts and Axillae:  No masses present on palpation, no breast tenderness. and No nodularity, asymmetry or nipple discharge bilaterally.  Gastrointestinal:   Abdominal Examination:  Abdomen nontender to palpation, tone normal " without rigidity or guarding, no masses present, umbilicus without lesions   Liver and Spleen:  No hepatomegaly present, liver nontender to palpation    Hernias:  No hernias present  Lymphatic: Lymph Nodes:  No other lymphadenopathy present  Skin:  General Inspection:  No rashes present, no lesions present, no areas of  discoloration    Genitalia and Groin:  No rashes present, no lesions present, no areas of  discoloration, no masses present  Neurologic/Psychiatric:    Mental Status:  Oriented X3     Pelvic Exam:  External Genitalia:     Normal appearance for age, no discharge present, no tenderness present, no inflammatory lesions present, color normal  Vagina:     Normal vaginal vault without central or paravaginal defects, ATROPHIC  Bladder:     Nontender to palpation  Urethra:   Urethral Body:  Urethra palpation normal, urethra structural support normal   Urethral Meatus:  No erythema or lesions present  Cervix:     Appearance healthy, no lesions present, nontender to palpation, no bleeding present  Uterus:     Nontender to palpation, no masses present, position anteflexed, mobility: normal  Adnexa:     No adnexal tenderness present, no adnexal masses present  Perineum:     Perineum within normal limits, no evidence of trauma, no rashes or skin lesions present  Inguinal Lymph Nodes:     No lymphadenopathy present      COUNSELING:   Reviewed preventive health counseling, as reflected in patient instructions  Special attention given to:        Osteoporosis Prevention/Bone Health       Colon cancer screening       (Tomasa)menopause management    BMI: Body mass index is 21.03 kg/m .      ASSESSMENT:  64 year old female with satisfactory annual exam.    ICD-10-CM    1. Encounter for gynecological examination without abnormal finding Z01.419    2. 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 Screen for colon cancer Z12.11  GASTROENTEROLOGY ADULT REF PROCEDURE ONLY   4. Osteoporosis, senile M81.0 alendronate (FOSAMAX) 70 MG tablet   5. Primary insomnia F51.01    6. Need for Tdap vaccination Z23 TDAP, IM (10 - 64 YRS) - Adacel     ADMIN 1st VACCINE   7. Need for pneumococcal vaccine Z23 EA ADD'L VACCINE     PCV13, IM (6+ WK) - [id number]       PLAN:  Pap is UTD. She had HPV in 2014 before she was even s.a with her current partner but then repeat was normal and pap was neg last year. Will plan to do a pap next year and again at 67 and then if still with her partner and both of those paps are nil/neg that can d/c screening due to age as per ASCCP guidelines    mammo today    Patient had tdap and her pcv 13 done today. Informed that we do not currently have shingrix and it's on national shortage but can do that whenever we get it.    Patient will continue on her fosamax and will f/u with her once her bone density from today is back    Spent about 15 additional minutes discussing libidio, hormones, testosterone in both injection and cream form, and sensation/orgasm as we age and postmenopausally.    Am frankly surprised that she is not having more discomfort and difficulty with I.C b/c her vaginal introitus is very tight and small. Once past that she has a little more normal vaginal caliber and compliance but surprised insertion is not more troublesome.  Discussed how estrace works for general dryness, lubrication, compliance and stretch of tissue. Discussed it's use and FDA approval as well as cost and r/b vs sytemic HRT which she really prefers not to do.    Then discussed testosterone cream as much more helpful for sensation when placed directly on clitoris with very small risk clitoromegaly but also could do inner thigh. Understands that this is off label usage as not FDA approved. Also discussed that for general libido as well as sensation and orgasm as well as just general energy and sense of well being that the testosterone injection  "can sometimes be more effective and then testosterone cream can help to prolong the effects. Also off label usage as not FDA approved but many pts with success.    After discussing all options she will do the injection today. Will do the 150mg dose and she will contact me to let me know if it worked, how well it worked, and how long she feels the effects lasted. If no effect then would do 200mg but not sooner than in one month. If still not effective enough could then just try the cream only given sensation is the biggest issue. If gets some shahid from the shot but short lived could then do shot with cream for prolonged effect.    Could also at any point just do estrace cream. Could do just that or even with the above regimen b/c some of her \"not feeling the same down there\" really could be that she has less stretch and more dryness then she realizes as the cause of her sx.    Brandy Logan MD  "

## 2019-04-17 NOTE — NURSING NOTE
Prior to injection, verified patient identity using patient's name and date of birth.  Due to injection administration, patient instructed to remain in clinic for 15 minutes  afterwards, and to report any adverse reaction to me immediately.     New Rx for Testosterone today per Dr Logan     Administered testosterone medication in clinic.  Medication used from clinic stock.    Drug Amount Wasted:  Yes: 50 mg/ml   Vial/Syringe: Single dose vial  Expiration Date:  02/2021    Pt will be in clinic for scheduled Dexa scan. Instructed pt to inform staff if develops any sx's of adverse reactions from injections: itching, rash, swelling, dyspnea.    Pt verbalized understanding, in agreement with plan, and voiced no further questions.

## 2019-04-18 RX ORDER — ALENDRONATE SODIUM 70 MG/1
TABLET ORAL
Qty: 12 TABLET | Refills: 3 | Status: SHIPPED | OUTPATIENT
Start: 2019-04-18 | End: 2020-05-31

## 2019-04-19 ENCOUNTER — HOSPITAL ENCOUNTER (OUTPATIENT)
Facility: CLINIC | Age: 64
End: 2019-04-19
Attending: SURGERY | Admitting: SURGERY
Payer: COMMERCIAL

## 2019-07-06 DIAGNOSIS — M81.0 OSTEOPOROSIS, SENILE: ICD-10-CM

## 2019-07-08 RX ORDER — ALENDRONATE SODIUM 70 MG/1
TABLET ORAL
Qty: 12 TABLET | Refills: 0 | OUTPATIENT
Start: 2019-07-08

## 2019-07-08 NOTE — TELEPHONE ENCOUNTER
"Requested Prescriptions   Pending Prescriptions Disp Refills     alendronate (FOSAMAX) 70 MG tablet [Pharmacy Med Name: ALENDRONATE 70MG TABLETS] 12 tablet 0     Sig: TAKE 1 TABLET BY MOUTH WITH 8 OZ OF WATER EVERY 7 DAYS 30 MINUTES BEFORE BREAKFAST AND REMAIN UPRIGHT DURING THIS TIME       Bisphosphonates Failed - 7/6/2019  3:28 AM        Failed - Normal serum creatinine on file within past 12 months     Recent Labs   Lab Test 03/07/18  0914   CR 0.65             Passed - Recent (12 mo) or future (30 days) visit within the authorizing provider's specialty     Patient had office visit in the last 12 months or has a visit in the next 30 days with authorizing provider or within the authorizing provider's specialty.  See \"Patient Info\" tab in inbasket, or \"Choose Columns\" in Meds & Orders section of the refill encounter.              Passed - Dexa on file within past 2 years     Please review last Dexa result.           Passed - Medication is active on med list        Passed - Patient is age 18 or older      Last Written Prescription Date:  4/18/19  Last Fill Quantity: 12,  # refills: 3   Last office visit: 4/17/2019 with prescribing provider:  [facility]   Future Office Visit:    Refill sent 4/19. Refill denied.   "

## 2019-09-03 ENCOUNTER — ALLIED HEALTH/NURSE VISIT (OUTPATIENT)
Dept: NURSING | Facility: CLINIC | Age: 64
End: 2019-09-03
Payer: COMMERCIAL

## 2019-09-03 DIAGNOSIS — R68.82 LOW LIBIDO: Primary | ICD-10-CM

## 2019-09-03 PROCEDURE — 96372 THER/PROPH/DIAG INJ SC/IM: CPT

## 2019-09-03 RX ADMIN — TESTOSTERONE CYPIONATE 150 MG: 200 INJECTION, SOLUTION INTRAMUSCULAR at 11:20

## 2019-09-03 NOTE — NURSING NOTE
"Clinic Administered Medication Documentation    MEDICATION LIST:   Testosterone Documentation     Prior to injection, verified patient identity using patient's name and date of birth. Medication was administered. Please see MAR and medication order for additional information. Patient instructed to report any adverse reaction to staff immediately .    Reminders     - Check vial for refills remaining and initiate refill request if no refills remain.      - Verify with patient that medication was paid for at pharmacy. If it was, check the \"patient supplied\" box on the MAR.     Was entire vial of medication used? No, The remainder 50MG of 200MG was discarded as unavoidable waste.  Vial/Syringe: Single dose vial  Expiration Date:  02/2021  Was this medication supplied by the patient? No     Tolerated injection and discharged home without incident.  Cindi Wells RN on 9/3/2019 at 11:23 AM        "

## 2019-09-03 NOTE — NURSING NOTE
"4/17/2019 OV with DR Logan:  After discussing all options she will do the injection today. Will do the 150mg dose and she will contact me to let me know if it worked, how well it worked, and how long she feels the effects lasted. If no effect then would do 200mg but not sooner than in one month. If still not effective enough could then just try the cream only given sensation is the biggest issue. If gets some shahid from the shot but short lived could then do shot with cream for prolonged effect.     Pt scheduled second injection; now here in clinic  Last Testosterone injection 150mg 4/17/2019  No further orders/CAM in [facility] as pt was to update Dr Logan  Pt states 1st injection went well and made a \"significant difference and would like to continue\" Noted effects lasted about 1 month.    Routing pt request to Dr Logan for orders to continue.  Pended Rx    Received verbal order from DR Logan to proceed with Testosterone 150 mg  "

## 2019-09-04 RX ORDER — TESTOSTERONE CYPIONATE 200 MG/ML
150 INJECTION, SOLUTION INTRAMUSCULAR
Status: ACTIVE | OUTPATIENT
Start: 2019-09-04 | End: 2020-02-19

## 2019-09-29 ENCOUNTER — HEALTH MAINTENANCE LETTER (OUTPATIENT)
Age: 64
End: 2019-09-29

## 2020-01-02 ENCOUNTER — ALLIED HEALTH/NURSE VISIT (OUTPATIENT)
Dept: NURSING | Facility: CLINIC | Age: 65
End: 2020-01-02
Payer: COMMERCIAL

## 2020-01-02 DIAGNOSIS — R68.82 LOW LIBIDO: Primary | ICD-10-CM

## 2020-01-02 PROCEDURE — 96372 THER/PROPH/DIAG INJ SC/IM: CPT

## 2020-01-02 RX ADMIN — TESTOSTERONE CYPIONATE 150 MG: 200 INJECTION, SOLUTION INTRAMUSCULAR at 14:08

## 2020-01-02 NOTE — PROGRESS NOTES
"Clinic Administered Medication Documentation    MEDICATION LIST:   Testosterone Documentation     Prior to injection, verified patient identity using patient's name and date of birth. Medication was administered. Please see MAR and medication order for additional information. Patient instructed to report any adverse reaction to staff immediately .    Reminders     - Check vial for refills remaining and initiate refill request if no refills remain.      - Verify with patient that medication was paid for at pharmacy. If it was, check the \"patient supplied\" box on the MAR.     Was entire vial of medication used? No, The remainder 50MG of 200MG was discarded as unavoidable waste.  Vial/Syringe: Single dose vial  Expiration Date:  05/2021  Was this medication supplied by the patient? No   Injection tolerated and discharged without incident  Cheyenne Wise RN on 1/2/2020 at 2:11 PM'     "

## 2020-05-31 DIAGNOSIS — M81.0 OSTEOPOROSIS, SENILE: ICD-10-CM

## 2020-05-31 RX ORDER — ALENDRONATE SODIUM 70 MG/1
TABLET ORAL
Qty: 4 TABLET | Refills: 0 | Status: SHIPPED | OUTPATIENT
Start: 2020-05-31 | End: 2020-08-11

## 2020-06-01 NOTE — TELEPHONE ENCOUNTER
"Requested Prescriptions   Pending Prescriptions Disp Refills     alendronate (FOSAMAX) 70 MG tablet [Pharmacy Med Name: ALENDRONATE 70MG TABLETS] 12 tablet 3     Sig: TAKE 1 TABLET BY MOUTH WITH 8 OZ OF WATER EVERY 7 DAYS 30 MINUTES BEFORE BREAKFAST& REMAIN UPRIGHT DURING THIS TIME       Bisphosphonates Failed - 5/31/2020 11:56 AM        Failed - Recent (12 mo) or future (30 days) visit within the authorizing provider's specialty     Patient has had an office visit with the authorizing provider or a provider within the authorizing providers department within the previous 12 mos or has a future within next 30 days. See \"Patient Info\" tab in inbasket, or \"Choose Columns\" in Meds & Orders section of the refill encounter.              Failed - Normal serum creatinine on file within past 12 months     Recent Labs   Lab Test 03/07/18  0914   CR 0.65       Ok to refill medication if creatinine is low          Passed - Dexa on file within past 2 years     Please review last Dexa result.           Passed - Medication is active on med list        Passed - Patient is age 18 or older           Last Written Prescription Date:  4/18/19  Last Fill Quantity: 12,  # refills: 3   Last office visit: 4/17/2019 with prescribing provider:  Dr Logan   Future Office Visit:  None  Medication is being filled for 1 time refill only due to:  Patient needs to be seen because it has been more than one year since last visit.  Cindi Wells RN on 5/31/2020 at 8:19 PM            "

## 2020-07-11 DIAGNOSIS — M81.0 OSTEOPOROSIS, SENILE: ICD-10-CM

## 2020-07-12 RX ORDER — ALENDRONATE SODIUM 70 MG/1
TABLET ORAL
Qty: 4 TABLET | Refills: 0 | OUTPATIENT
Start: 2020-07-12

## 2020-07-13 NOTE — TELEPHONE ENCOUNTER
"Requested Prescriptions   Pending Prescriptions Disp Refills     alendronate (FOSAMAX) 70 MG tablet [Pharmacy Med Name: ALENDRONATE 70MG TABLETS] 4 tablet 0     Sig: TAKE 1 TABLET BY MOUTH WITH 8 OZ OF WATER EVERY 7 DAYS 30 MINUTES BEFORE BREAKFAST AND REMAIN UPRIGHT DURING THIS TIME       Bisphosphonates Failed - 7/11/2020 12:09 PM        Failed - Recent (12 mo) or future (30 days) visit within the authorizing provider's specialty     Patient has had an office visit with the authorizing provider or a provider within the authorizing providers department within the previous 12 mos or has a future within next 30 days. See \"Patient Info\" tab in inbasket, or \"Choose Columns\" in Meds & Orders section of the refill encounter.              Failed - Normal serum creatinine on file within past 12 months     Recent Labs   Lab Test 03/07/18  0914   CR 0.65       Ok to refill medication if creatinine is low          Passed - Dexa on file within past 2 years     Please review last Dexa result.           Passed - Medication is active on med list        Passed - Patient is age 18 or older           Last Written Prescription Date:  5/31/20  Last Fill Quantity: 4,  # refills: 0   Last office visit: 4/17/2019 with prescribing provider:  Dr Logan   Future Office Visit:  None scheduled    Pt due for annual, no appt scheduled. Pt already received one month extension. Rx denied.   Cindi Wells RN on 7/12/2020 at 8:35 PM              "

## 2020-07-30 ENCOUNTER — ALLIED HEALTH/NURSE VISIT (OUTPATIENT)
Dept: NURSING | Facility: CLINIC | Age: 65
End: 2020-07-30
Payer: COMMERCIAL

## 2020-07-30 DIAGNOSIS — R68.82 LOW LIBIDO: Primary | ICD-10-CM

## 2020-07-30 PROCEDURE — 99207 ZZC NO BILLABLE SERVICE THIS VISIT: CPT

## 2020-07-30 NOTE — PROGRESS NOTES
Pt here for testosterone injection.  No active orders.  Last annual exam with [facility] 4/17/19  Last injection 1/2020  Dr Logan not in office.    Informed pt needs annual exam for further injection orders. Pt very understanding and aware she is overdue. She was discharged without injection and will schedule.  Cindi Wells RN on 7/30/2020 at 11:39 AM

## 2020-08-11 ENCOUNTER — OFFICE VISIT (OUTPATIENT)
Dept: OBGYN | Facility: CLINIC | Age: 65
End: 2020-08-11
Payer: COMMERCIAL

## 2020-08-11 ENCOUNTER — TRANSFERRED RECORDS (OUTPATIENT)
Dept: HEALTH INFORMATION MANAGEMENT | Facility: CLINIC | Age: 65
End: 2020-08-11

## 2020-08-11 ENCOUNTER — TELEPHONE (OUTPATIENT)
Dept: OBGYN | Facility: CLINIC | Age: 65
End: 2020-08-11

## 2020-08-11 VITALS
DIASTOLIC BLOOD PRESSURE: 58 MMHG | HEART RATE: 60 BPM | WEIGHT: 118.4 LBS | BODY MASS INDEX: 21.79 KG/M2 | HEIGHT: 62 IN | SYSTOLIC BLOOD PRESSURE: 96 MMHG

## 2020-08-11 DIAGNOSIS — Z12.12 SCREENING FOR COLORECTAL CANCER: ICD-10-CM

## 2020-08-11 DIAGNOSIS — Z13.820 SCREENING FOR OSTEOPOROSIS: ICD-10-CM

## 2020-08-11 DIAGNOSIS — Z01.419 ENCOUNTER FOR GYNECOLOGICAL EXAMINATION WITHOUT ABNORMAL FINDING: Primary | ICD-10-CM

## 2020-08-11 DIAGNOSIS — Z13.6 SCREENING FOR CARDIOVASCULAR CONDITION: ICD-10-CM

## 2020-08-11 DIAGNOSIS — Z13.228 SCREENING FOR METABOLIC DISORDER: ICD-10-CM

## 2020-08-11 DIAGNOSIS — Z13.21 ENCOUNTER FOR VITAMIN DEFICIENCY SCREENING: ICD-10-CM

## 2020-08-11 DIAGNOSIS — Z12.11 SCREENING FOR COLORECTAL CANCER: ICD-10-CM

## 2020-08-11 DIAGNOSIS — R68.82 LOW LIBIDO: Primary | ICD-10-CM

## 2020-08-11 DIAGNOSIS — M81.0 OSTEOPOROSIS, SENILE: ICD-10-CM

## 2020-08-11 PROCEDURE — 84443 ASSAY THYROID STIM HORMONE: CPT | Performed by: OBSTETRICS & GYNECOLOGY

## 2020-08-11 PROCEDURE — 82306 VITAMIN D 25 HYDROXY: CPT | Performed by: OBSTETRICS & GYNECOLOGY

## 2020-08-11 PROCEDURE — 80053 COMPREHEN METABOLIC PANEL: CPT | Performed by: OBSTETRICS & GYNECOLOGY

## 2020-08-11 PROCEDURE — 36415 COLL VENOUS BLD VENIPUNCTURE: CPT | Performed by: OBSTETRICS & GYNECOLOGY

## 2020-08-11 PROCEDURE — 80061 LIPID PANEL: CPT | Performed by: OBSTETRICS & GYNECOLOGY

## 2020-08-11 PROCEDURE — G0145 SCR C/V CYTO,THINLAYER,RESCR: HCPCS | Performed by: OBSTETRICS & GYNECOLOGY

## 2020-08-11 PROCEDURE — 99396 PREV VISIT EST AGE 40-64: CPT | Performed by: OBSTETRICS & GYNECOLOGY

## 2020-08-11 PROCEDURE — 87624 HPV HI-RISK TYP POOLED RSLT: CPT | Performed by: OBSTETRICS & GYNECOLOGY

## 2020-08-11 RX ORDER — ALENDRONATE SODIUM 70 MG/1
TABLET ORAL
Qty: 12 TABLET | Refills: 3 | Status: SHIPPED | OUTPATIENT
Start: 2020-08-11 | End: 2021-09-01

## 2020-08-11 ASSESSMENT — PATIENT HEALTH QUESTIONNAIRE - PHQ9
SUM OF ALL RESPONSES TO PHQ QUESTIONS 1-9: 1
5. POOR APPETITE OR OVEREATING: NOT AT ALL

## 2020-08-11 ASSESSMENT — ANXIETY QUESTIONNAIRES
6. BECOMING EASILY ANNOYED OR IRRITABLE: NOT AT ALL
1. FEELING NERVOUS, ANXIOUS, OR ON EDGE: NOT AT ALL
IF YOU CHECKED OFF ANY PROBLEMS ON THIS QUESTIONNAIRE, HOW DIFFICULT HAVE THESE PROBLEMS MADE IT FOR YOU TO DO YOUR WORK, TAKE CARE OF THINGS AT HOME, OR GET ALONG WITH OTHER PEOPLE: NOT DIFFICULT AT ALL
3. WORRYING TOO MUCH ABOUT DIFFERENT THINGS: NOT AT ALL
5. BEING SO RESTLESS THAT IT IS HARD TO SIT STILL: NOT AT ALL
2. NOT BEING ABLE TO STOP OR CONTROL WORRYING: NOT AT ALL
7. FEELING AFRAID AS IF SOMETHING AWFUL MIGHT HAPPEN: NOT AT ALL
GAD7 TOTAL SCORE: 0

## 2020-08-11 ASSESSMENT — MIFFLIN-ST. JEOR: SCORE: 1041.34

## 2020-08-11 NOTE — PROGRESS NOTES
Liliana is a 65 year old  female who presents for annual exam.     Besides routine health maintenance,  she would like to discuss her insomnia.    HPI:  The patient's PCP is  Kelle Stanley MD.        GYNECOLOGIC HISTORY:    No LMP recorded. Patient is postmenopausal.  Her current contraception method is: menopause.  She  reports that she has never smoked. She has never used smokeless tobacco.    Patient is sexually active.  STD testing offered?  Declined  Last PHQ-9 score on record =   PHQ-9 SCORE 2020   PHQ-9 Total Score 1     Last GAD7 score on record =   EVELIN-7 SCORE 2020   Total Score 0     Alcohol Score = 2    HEALTH MAINTENANCE:  Cholesterol:   Recent Labs   Lab Test 18  0914   CHOL 162   HDL 81   LDL 73   TRIG 40     Last Mammo: One year ago, Result: Normal, Next Mammo: Due   Pap:   Lab Results   Component Value Date    PAP NIL, HPV- 2018      Colonoscopy:  N/a, Result: Not applicable, Next Colonoscopy: Overdue.  Dexa:  2019- Moderate osteopenia    Health maintenance updated:  yes    HISTORY:  OB History    Para Term  AB Living   2 2 2 0 0 2   SAB TAB Ectopic Multiple Live Births   0 0 0 0 2      # Outcome Date GA Lbr Kofi/2nd Weight Sex Delivery Anes PTL Lv   2 Term     M CS-LTranv   DARIEL   1 Term     F CS-LTranv   DARIEL       Patient Active Problem List   Diagnosis     Status post right hip replacement     Primary insomnia     Osteoporosis, senile     Past Surgical History:   Procedure Laterality Date     AS PARTIAL HIP REPLACEMENT  2017     HYSTEROSCOPY  2010    polypectomy with morsellator done by [facility]. path c/w polyp but also simple hyperplasia w/o atypia...3 months of provera planned.      Social History     Tobacco Use     Smoking status: Never Smoker     Smokeless tobacco: Never Used   Substance Use Topics     Alcohol use: Yes     Comment: Occas      Problem (# of Occurrences) Relation (Name,Age of Onset)    Cancer (1) Father    Diabetes (2)  "Father, Brother    Heart Disease (1) Maternal Grandmother    Hyperlipidemia (2) Father, Brother    Hypertension (2) Father, Brother            Current Outpatient Medications   Medication Sig     alendronate (FOSAMAX) 70 MG tablet TAKE 1 TABLET BY MOUTH WITH 8 OZ OF WATER EVERY 7 DAYS 30 MINUTES BEFORE BREAKFAST& REMAIN UPRIGHT DURING THIS TIME;NEEDS APPOINTMENT for further refills     Doxylamine Succinate, Sleep, (SLEEP AID PO)      Multiple Vitamins-Minerals (MULTIVITAMIN ADULT PO)      calcium carbonate (OS- MG [place]. CA) 1250 MG tablet Take 1 tablet by mouth 2 times daily     Cyanocobalamin (VITAMIN B 12 PO)      TURMERIC PO      VITAMIN D, CHOLECALCIFEROL, PO Take by mouth daily     No current facility-administered medications for this visit.      Allergies   Allergen Reactions     Diatrizoate Swelling, Hives and Shortness Of Breath     Hydrocodone-Acetaminophen Itching       Past medical, surgical, social and family histories were reviewed and updated in EPIC.    ROS:   12 point review of systems negative other than symptoms noted below or in the HPI.  Psychiatric: Difficulty Sleeping  No urinary frequency or dysuria, bladder or kidney problems    EXAM:  BP 96/58   Pulse 60   Ht 1.568 m (5' 1.75\")   Wt 53.7 kg (118 lb 6.4 oz)   Breastfeeding No   BMI 21.83 kg/m     BMI: Body mass index is 21.83 kg/m .    PHYSICAL EXAM:  Constitutional:   Appearance: Well nourished, well developed, alert, in no acute distress  Neck:  Lymph Nodes:  No lymphadenopathy present    Thyroid:  Gland size normal, nontender, no nodules or masses present  on palpation  Chest:  Respiratory Effort:  Breathing unlabored  Cardiovascular:    Heart: Auscultation:  Regular rate, normal rhythm, no murmurs present  Breasts: Inspection of Breasts:  No lymphadenopathy present., Palpation of Breasts and Axillae:  No masses present on palpation, no breast tenderness., Axillary Lymph Nodes:  No lymphadenopathy present. and No nodularity, " asymmetry or nipple discharge bilaterally.  Gastrointestinal:   Abdominal Examination:  Abdomen nontender to palpation, tone normal without rigidity or guarding, no masses present, umbilicus without lesions   Liver and Spleen:  No hepatomegaly present, liver nontender to palpation    Hernias:  No hernias present  Lymphatic: Lymph Nodes:  No other lymphadenopathy present  Skin:  General Inspection:  No rashes present, no lesions present, no areas of  discoloration  Neurologic:    Mental Status:  Oriented X3.  Normal strength and tone, sensory exam                grossly normal, mentation intact and speech normal.    Psychiatric:   Mentation appears normal and affect normal/bright.         Pelvic Exam:  External Genitalia:     Normal appearance for age, no discharge present, no tenderness present, no inflammatory lesions present, color normal  Vagina:     Normal vaginal vault without central or paravaginal defects, no discharge present, no inflammatory lesions present, no masses present  Bladder:     Nontender to palpation  Urethra:   Urethral Body:  Urethra palpation normal, urethra structural support normal   Urethral Meatus:  No erythema or lesions present  Cervix:     Appearance healthy, no lesions present, nontender to palpation, no bleeding present  Uterus:     Uterus: firm, normal sized and nontender,  Adnexa:     No adnexal tenderness present, no adnexal masses present  Perineum:     Perineum within normal limits, no evidence of trauma, no rashes or skin lesions present  Anus:     Anus within normal limits, no hemorrhoids present  Inguinal Lymph Nodes:     No lymphadenopathy present  Pubic Hair:     Normal pubic hair distribution for age  Genitalia and Groin:     No rashes present, no lesions present, no areas of discoloration, no masses present      COUNSELING:   Reviewed preventive health counseling, as reflected in patient instructions    BMI: Body mass index is 21.83 kg/m .      ASSESSMENT:  65 year old  female with satisfactory annual exam.    ICD-10-CM    1. Encounter for gynecological examination without abnormal finding  Z01.419 Pap imaged thin layer screen with HPV - recommended age 30 - 65     HPV High Risk Types DNA Cervical   2. Osteoporosis, senile  M81.0 alendronate (FOSAMAX) 70 MG tablet   3. Screening for colorectal cancer  Z12.11 GASTROENTEROLOGY ADULT REF PROCEDURE ONLY    Z12.12        PLAN:  1. Colonoscopy referral  2. Fasting labs  3. Mammogram  4. DEXA scan  5. Pap today      Tessie Araya MD

## 2020-08-11 NOTE — TELEPHONE ENCOUNTER
Pt here for annual visit with Dr Araya today    Pt asked if she could have injection today to the .    This nurse asked Dr Araya about testosterone injection order. Many topics were discussed but did not discuss continued monthly Testosterone Injecions for decreased libido. She does not order this for her patients and she recommended pt wait for today's results to return before proceeding with testosterone. Will need Dr Logan to order the testosterone going forward.    Pt will await results and does want Dr Logan input going forward and if she will continue ordering testosterone injections for her?    Routing to Dr Logan to advise.    Cindi Wells, RN on 8/11/2020 at 12:23 PM

## 2020-08-12 LAB
ALBUMIN SERPL-MCNC: 3.7 G/DL (ref 3.4–5)
ALP SERPL-CCNC: 52 U/L (ref 40–150)
ALT SERPL W P-5'-P-CCNC: 17 U/L (ref 0–50)
ANION GAP SERPL CALCULATED.3IONS-SCNC: 4 MMOL/L (ref 3–14)
AST SERPL W P-5'-P-CCNC: 20 U/L (ref 0–45)
BILIRUB SERPL-MCNC: 0.5 MG/DL (ref 0.2–1.3)
BUN SERPL-MCNC: 15 MG/DL (ref 7–30)
CALCIUM SERPL-MCNC: 9 MG/DL (ref 8.5–10.1)
CHLORIDE SERPL-SCNC: 104 MMOL/L (ref 94–109)
CHOLEST SERPL-MCNC: 206 MG/DL
CO2 SERPL-SCNC: 28 MMOL/L (ref 20–32)
CREAT SERPL-MCNC: 0.7 MG/DL (ref 0.52–1.04)
DEPRECATED CALCIDIOL+CALCIFEROL SERPL-MC: 41 UG/L (ref 20–75)
GFR SERPL CREATININE-BSD FRML MDRD: >90 ML/MIN/{1.73_M2}
GLUCOSE SERPL-MCNC: 81 MG/DL (ref 70–99)
HDLC SERPL-MCNC: 74 MG/DL
LDLC SERPL CALC-MCNC: 118 MG/DL
NONHDLC SERPL-MCNC: 132 MG/DL
POTASSIUM SERPL-SCNC: 4.1 MMOL/L (ref 3.4–5.3)
PROT SERPL-MCNC: 8.1 G/DL (ref 6.8–8.8)
SODIUM SERPL-SCNC: 136 MMOL/L (ref 133–144)
TRIGL SERPL-MCNC: 72 MG/DL
TSH SERPL DL<=0.005 MIU/L-ACNC: 1.92 MU/L (ref 0.4–4)

## 2020-08-12 ASSESSMENT — ANXIETY QUESTIONNAIRES: GAD7 TOTAL SCORE: 0

## 2020-08-12 NOTE — TELEPHONE ENCOUNTER
Is she doing it monthly? What dose?  I almost never do it monthly and then I would have checked a testosterone level.   And I don't see one. Did they discuss that?    If we can add it on great o/w i'll need her to have it drawn if she's been doing it that often and then she needs a telehealth visit with me about that since I didn't see her for annual

## 2020-08-12 NOTE — TELEPHONE ENCOUNTER
Left message that Dr. Logan would like to have a testosterone level drawn and telehealth visit set with her to discuss the testosterone injections.    Told to call to schedule appointments or speak with triage. Future lab order placed.    Cheyenne Wise RN on 8/12/2020 at 4:25 PM

## 2020-08-13 LAB
COPATH REPORT: NORMAL
PAP: NORMAL

## 2020-08-26 ENCOUNTER — TELEPHONE (OUTPATIENT)
Dept: OBGYN | Facility: CLINIC | Age: 65
End: 2020-08-26

## 2020-08-26 DIAGNOSIS — R68.82 LOW LIBIDO: ICD-10-CM

## 2020-08-26 PROCEDURE — 84403 ASSAY OF TOTAL TESTOSTERONE: CPT | Performed by: OBSTETRICS & GYNECOLOGY

## 2020-08-26 PROCEDURE — 84270 ASSAY OF SEX HORMONE GLOBUL: CPT | Performed by: OBSTETRICS & GYNECOLOGY

## 2020-08-26 PROCEDURE — 36415 COLL VENOUS BLD VENIPUNCTURE: CPT | Performed by: OBSTETRICS & GYNECOLOGY

## 2020-08-26 NOTE — TELEPHONE ENCOUNTER
Patient is calling to discuss her thyroid levels with a nurse.  Patient would also like to discuss getting a testosterone shot with Dr. Logan.  Please give her a call back.    Thank you

## 2020-08-26 NOTE — TELEPHONE ENCOUNTER
Called pt to discuss. She was recommended to make lab apt for testosterone level and televisit with Dr Logan to discuss testosterone injections.   Pt verbalized understanding, in agreement with plan, and voiced no further questions.  Transferred to scheduling.    Cindi Wells RN on 8/26/2020 at 12:20 PM

## 2020-08-28 LAB
SHBG SERPL-SCNC: 96 NMOL/L (ref 30–135)
TESTOST FREE SERPL-MCNC: 0.03 NG/DL (ref 0.06–0.38)
TESTOST SERPL-MCNC: 6 NG/DL (ref 8–60)

## 2020-08-31 ENCOUNTER — ALLIED HEALTH/NURSE VISIT (OUTPATIENT)
Dept: NURSING | Facility: CLINIC | Age: 65
End: 2020-08-31
Payer: COMMERCIAL

## 2020-08-31 DIAGNOSIS — R68.82 LOW LIBIDO: Primary | ICD-10-CM

## 2020-08-31 PROCEDURE — 96372 THER/PROPH/DIAG INJ SC/IM: CPT

## 2020-08-31 RX ORDER — TESTOSTERONE CYPIONATE 200 MG/ML
150 INJECTION, SOLUTION INTRAMUSCULAR ONCE
Status: COMPLETED | OUTPATIENT
Start: 2020-08-31 | End: 2020-08-31

## 2020-08-31 RX ADMIN — TESTOSTERONE CYPIONATE 150 MG: 200 INJECTION, SOLUTION INTRAMUSCULAR at 13:13

## 2020-08-31 NOTE — NURSING NOTE
"Clinic Administered Medication Documentation      Testosterone Documentation     Prior to injection, verified patient identity using patient's name and date of birth. Medication was administered. Please see MAR and medication order for additional information. Patient instructed to report any adverse reaction to staff immediately .    Reminders     - Check vial for refills remaining and initiate refill request if no refills remain.      - Verify with patient that medication was paid for at pharmacy. If it was, check the \"patient supplied\" box on the MAR.     Was entire vial of medication used? No, The remainder 50MG of 200MG was discarded as unavoidable waste.  Vial/Syringe: Single dose vial  Expiration Date:  11/2022  Was this medication supplied by the patient? No     Patient tolerated injection without incident and discharged home.  Cindi Wells RN on 8/31/2020 at 11:45 AM        "

## 2021-01-07 ENCOUNTER — ALLIED HEALTH/NURSE VISIT (OUTPATIENT)
Dept: NURSING | Facility: CLINIC | Age: 66
End: 2021-01-07
Payer: COMMERCIAL

## 2021-01-07 DIAGNOSIS — R68.82 LOW LIBIDO: Primary | ICD-10-CM

## 2021-01-07 PROCEDURE — 99207 PR NO CHARGE NURSE ONLY: CPT

## 2021-01-07 PROCEDURE — 96372 THER/PROPH/DIAG INJ SC/IM: CPT

## 2021-01-07 RX ORDER — TESTOSTERONE CYPIONATE 200 MG/ML
150 INJECTION, SOLUTION INTRAMUSCULAR
Status: ACTIVE | OUTPATIENT
Start: 2021-02-18 | End: 2021-08-05

## 2021-01-07 RX ORDER — TESTOSTERONE CYPIONATE 200 MG/ML
150 INJECTION, SOLUTION INTRAMUSCULAR
Status: DISCONTINUED | OUTPATIENT
Start: 2021-07-07 | End: 2021-01-07

## 2021-01-07 RX ADMIN — TESTOSTERONE CYPIONATE 150 MG: 200 INJECTION, SOLUTION INTRAMUSCULAR at 11:32

## 2021-01-07 NOTE — Clinical Note
Sorry, I ordered the dose number wrong - I meant to put order  2021 but it went in as started  - my bad but thanks for resigning the new order :) That's all

## 2021-01-07 NOTE — NURSING NOTE
"Clinic Administered Medication Documentation      Testosterone Documentation     Prior to injection, verified patient identity using patient's name and date of birth. Medication was administered. Please see MAR and medication order for additional information. Patient instructed to report any adverse reaction to staff immediately .    Reminders     - Check vial for refills remaining and initiate refill request if no refills remain.     Spoke with Dr Logan about dose and frequency. Pt reported no bad side effects, felt it worked well. Order received and signed by Dr Logan   - Verify with patient that medication was paid for at pharmacy. If it was, check the \"patient supplied\" box on the MAR.     Was entire vial of medication used? No, The remainder 50MG of 200MG was discarded as unavoidable waste.  Vial/Syringe: Single dose vial    Was this medication supplied by the patient? No     Patient tolerated injection without incident and discharged home.    Cindi Wells RN on 1/7/2021 at 11:34 AM        "

## 2021-09-01 DIAGNOSIS — M81.0 OSTEOPOROSIS, SENILE: ICD-10-CM

## 2021-09-01 RX ORDER — ALENDRONATE SODIUM 70 MG/1
TABLET ORAL
Qty: 4 TABLET | Refills: 0 | Status: SHIPPED | OUTPATIENT
Start: 2021-09-01

## 2021-09-01 NOTE — TELEPHONE ENCOUNTER
"Requested Prescriptions   Pending Prescriptions Disp Refills     alendronate (FOSAMAX) 70 MG tablet [Pharmacy Med Name: ALENDRONATE 70MG TABLETS] 4 tablet      Sig: TAKE 1 TABLET BY MOUTH WITH 8 OZ OF WATER EVERY 7 DAYS 30 MINUTES BEFORE BREAKFAST AND REMAIN UPRIGHT DURING THIS TIME       Bisphosphonates Failed - 9/1/2021  8:46 AM        Failed - Recent (12 mo) or future (30 days) visit within the authorizing provider's specialty     Patient has had an office visit with the authorizing provider or a provider within the authorizing providers department within the previous 12 mos or has a future within next 30 days. See \"Patient Info\" tab in inbasket, or \"Choose Columns\" in Meds & Orders section of the refill encounter.              Failed - Dexa on file within past 2 years     Please review last Dexa result.           Failed - Normal serum creatinine on file within past 12 months     Recent Labs   Lab Test 08/11/20  1157   CR 0.70       Ok to refill medication if creatinine is low          Passed - Medication is active on med list        Passed - Patient is age 18 or older           Refill approved for one month  Appointment needed for further refills   Frances Arroyo RN on 9/1/2021 at 8:54 AM    "

## 2021-09-08 DIAGNOSIS — M81.0 OSTEOPOROSIS, SENILE: ICD-10-CM

## 2021-09-08 RX ORDER — ALENDRONATE SODIUM 70 MG/1
TABLET ORAL
Qty: 4 TABLET | Refills: 0 | OUTPATIENT
Start: 2021-09-08

## 2021-09-08 NOTE — TELEPHONE ENCOUNTER
Pt due for annual, no appt scheduled. Pt already received one month extension. Rx denied.   Cheyenne Wise RN on 9/8/2021 at 8:56 AM

## 2021-09-08 NOTE — TELEPHONE ENCOUNTER
"Requested Prescriptions   Pending Prescriptions Disp Refills     alendronate (FOSAMAX) 70 MG tablet [Pharmacy Med Name: ALENDRONATE 70MG TABLETS] 4 tablet 0     Sig: TAKE 1 TABLET BY MOUTH WITH 8OZ OF WATER EVERY 7 DAYS. TAKE 30 MINUTES BEFORE BREAKFAST AND REMAIN UPRIGHT DURING THIS TIME       Bisphosphonates Failed - 9/8/2021  8:01 AM        Failed - Recent (12 mo) or future (30 days) visit within the authorizing provider's specialty     Patient has had an office visit with the authorizing provider or a provider within the authorizing providers department within the previous 12 mos or has a future within next 30 days. See \"Patient Info\" tab in inbasket, or \"Choose Columns\" in Meds & Orders section of the refill encounter.              Failed - Dexa on file within past 2 years     Please review last Dexa result.           Failed - Normal serum creatinine on file within past 12 months     Recent Labs   Lab Test 08/11/20  1157   CR 0.70       Ok to refill medication if creatinine is low          Passed - Medication is active on med list        Passed - Patient is age 18 or older           Last Written Prescription Date:  9/1/21  Last Fill Quantity: 4,  # refills: 0   Last office visit: 8/11/2020 with prescribing provider:  Dr Araya   Future Office Visit:  None          "

## 2021-10-20 ENCOUNTER — ALLIED HEALTH/NURSE VISIT (OUTPATIENT)
Dept: NURSING | Facility: CLINIC | Age: 66
End: 2021-10-20
Payer: COMMERCIAL

## 2021-10-20 ENCOUNTER — TELEPHONE (OUTPATIENT)
Dept: OBGYN | Facility: CLINIC | Age: 66
End: 2021-10-20

## 2021-10-20 ENCOUNTER — TRANSFERRED RECORDS (OUTPATIENT)
Dept: HEALTH INFORMATION MANAGEMENT | Facility: CLINIC | Age: 66
End: 2021-10-20

## 2021-10-20 DIAGNOSIS — Z23 NEED FOR PROPHYLACTIC VACCINATION AND INOCULATION AGAINST INFLUENZA: Primary | ICD-10-CM

## 2021-10-20 DIAGNOSIS — Z23 FLU VACCINE NEED: Primary | ICD-10-CM

## 2021-10-20 PROCEDURE — 99207 PR NO CHARGE NURSE ONLY: CPT

## 2021-10-20 PROCEDURE — 90471 IMMUNIZATION ADMIN: CPT | Performed by: OBSTETRICS & GYNECOLOGY

## 2021-10-20 PROCEDURE — 99207 PR NO CHARGE NURSE ONLY: CPT | Performed by: OBSTETRICS & GYNECOLOGY

## 2021-10-20 PROCEDURE — 90682 RIV4 VACC RECOMBINANT DNA IM: CPT | Performed by: OBSTETRICS & GYNECOLOGY

## 2021-10-20 NOTE — TELEPHONE ENCOUNTER
Component      Latest Ref Rng & Units 8/26/2020   Testosterone Total      8 - 60 ng/dL 6 (L)   Sex Hormone Binding Globulin      30 - 135 nmol/L 96   Free Testosterone Calculated      0.06 - 0.38 ng/dL 0.03 (L)     Last physical-8/11/2020    Patient is currently on 150mg of testosterone last injection 1/7/2021    Reminded today that needs to schedule a annual physical and we can complete labs at that time.  can then order testosterone. Cannot give unless up to date on annual. Patient understood and agreeable with plan. Flu vaccine was given today.     Theodora Kearns RN

## 2021-10-23 ENCOUNTER — HEALTH MAINTENANCE LETTER (OUTPATIENT)
Age: 66
End: 2021-10-23

## 2021-10-25 NOTE — PROGRESS NOTES
Testosterone was not given on 10/20/21. Patient is overdue for an appointment and testosterone lab. This was discussed with patient. Flu injection was given today.    Theodora Kearns RN

## 2022-04-09 ENCOUNTER — HEALTH MAINTENANCE LETTER (OUTPATIENT)
Age: 67
End: 2022-04-09

## 2022-10-10 ENCOUNTER — HEALTH MAINTENANCE LETTER (OUTPATIENT)
Age: 67
End: 2022-10-10

## 2023-03-25 ENCOUNTER — HEALTH MAINTENANCE LETTER (OUTPATIENT)
Age: 68
End: 2023-03-25

## 2023-05-27 ENCOUNTER — HEALTH MAINTENANCE LETTER (OUTPATIENT)
Age: 68
End: 2023-05-27

## 2023-12-14 ENCOUNTER — OFFICE VISIT (OUTPATIENT)
Dept: PLASTIC SURGERY | Facility: CLINIC | Age: 68
End: 2023-12-14

## 2023-12-14 DIAGNOSIS — Z41.1 ENCOUNTER FOR COSMETIC PROCEDURE: Primary | ICD-10-CM

## 2023-12-14 NOTE — LETTER
12/14/2023       RE: Liliana Denson  [address]  [place] [address]     Dear Colleague,    Thank you for referring your patient, Liliana Denson, to the  [facility] at [facility]. Please see a copy of my visit note below.    Maria is in for consideration of facelift surgery.  She said she does not want a necklift.  She is aware of diffuse aging changes but is troubled by the middle third of her face.  She has had Botox and fillers with slight improvement.  She had a hip replacement but no other surgeries.  She takes no medications.  We talked about anticoagulants such as vitamin E and fish oil.  We spent 40 minutes in consultation.  We discussed the risks and benefits of surgical improvement.  Risks included but were not limited to infection scarring motor or sensory nerve problems inadequate correction hair loss and other abnormalities of healing.  Exam:  She has Ramirez 2 skin. And she is fit.  She has generalized laxity with brow descent eyelid skin redundancy descent of the midface jawline platysmal bands neck laxity and periorbital and perioral rhytids.  She has volume deflation in the midface and neck.  There is some submandibular gland ptosis but not profound.  Her platysma muscle has a fair amount of dehiscence.  We talked about a facelift and that it would have effect on the neck.  As she likes the changes that we demonstrated.  I do not think she needs an anterior approach to the neck.  And this is in keeping with her desires to have just a facelift.  She would like to defer periorbital rejuvenation.  Photos were taken today she will be going to [place] for the winter and would think about surgery in the spring or summer.  Quote was given today.  Typical recovery was also reviewed with her.      Again, thank you for allowing me to participate in the care of your patient.      Sincerely,    SONDRA MONET MD

## 2023-12-14 NOTE — PROGRESS NOTES
Maria is in for consideration of facelift surgery.  She said she does not want a necklift.  She is aware of diffuse aging changes but is troubled by the middle third of her face.  She has had Botox and fillers with slight improvement.  She had a hip replacement but no other surgeries.  She takes no medications.  We talked about anticoagulants such as vitamin E and fish oil.  We spent 40 minutes in consultation.  We discussed the risks and benefits of surgical improvement.  Risks included but were not limited to infection scarring motor or sensory nerve problems inadequate correction hair loss and other abnormalities of healing.  Exam:  She has Ramirez 2 skin. And she is fit.  She has generalized laxity with brow descent eyelid skin redundancy descent of the midface jawline platysmal bands neck laxity and periorbital and perioral rhytids.  She has volume deflation in the midface and neck.  There is some submandibular gland ptosis but not profound.  Her platysma muscle has a fair amount of dehiscence.  We talked about a facelift and that it would have effect on the neck.  As she likes the changes that we demonstrated.  I do not think she needs an anterior approach to the neck.  And this is in keeping with her desires to have just a facelift.  She would like to defer periorbital rejuvenation.  Photos were taken today she will be going to [place] for the winter and would think about surgery in the spring or summer.  Quote was given today.  Typical recovery was also reviewed with her.SONDRA MONET MD

## 2023-12-14 NOTE — LETTER
December 14, 2023  Re: Liliana Denson  1955    Dear Dr. Maza,    Thank you so much for referring Liliana Denson to the [facility]. I had the pleasure of visiting with Liliana today.     Attached you will find a copy of my note. Please feel free to reach out to me with any questions, [phone].     Maria is in for consideration of facelift surgery.  She said she does not want a necklift.  She is aware of diffuse aging changes but is troubled by the middle third of her face.  She has had Botox and fillers with slight improvement.  She had a hip replacement but no other surgeries.  She takes no medications.  We talked about anticoagulants such as vitamin E and fish oil.  We spent 40 minutes in consultation.  We discussed the risks and benefits of surgical improvement.  Risks included but were not limited to infection scarring motor or sensory nerve problems inadequate correction hair loss and other abnormalities of healing.  Exam:  She has Ramirez 2 skin. And she is fit.  She has generalized laxity with brow descent eyelid skin redundancy descent of the midface jawline platysmal bands neck laxity and periorbital and perioral rhytids.  She has volume deflation in the midface and neck.  There is some submandibular gland ptosis but not profound.  Her platysma muscle has a fair amount of dehiscence.  We talked about a facelift and that it would have effect on the neck.  As she likes the changes that we demonstrated.  I do not think she needs an anterior approach to the neck.  And this is in keeping with her desires to have just a facelift.  She would like to defer periorbital rejuvenation.  Photos were taken today she will be going to [place] for the winter and would think about surgery in the spring or summer.  Quote was given today.  Typical recovery was also reviewed with her.    Your trust in our practice and care is much appreciated.    Sincerely,    SONDRA MONET MD

## 2024-05-26 ENCOUNTER — HEALTH MAINTENANCE LETTER (OUTPATIENT)
Age: 69
End: 2024-05-26

## 2024-08-04 ENCOUNTER — HEALTH MAINTENANCE LETTER (OUTPATIENT)
Age: 69
End: 2024-08-04

## 2024-09-04 ENCOUNTER — HOSPITAL ENCOUNTER (OUTPATIENT)
Facility: AMBULATORY SURGERY CENTER | Age: 69
End: 2024-09-04
Attending: PLASTIC SURGERY

## 2025-06-14 ENCOUNTER — HEALTH MAINTENANCE LETTER (OUTPATIENT)
Age: 70
End: 2025-06-14

## 2025-08-16 ENCOUNTER — HEALTH MAINTENANCE LETTER (OUTPATIENT)
Age: 70
End: 2025-08-16